# Patient Record
Sex: FEMALE | Race: WHITE | Employment: OTHER | URBAN - NONMETROPOLITAN AREA
[De-identification: names, ages, dates, MRNs, and addresses within clinical notes are randomized per-mention and may not be internally consistent; named-entity substitution may affect disease eponyms.]

---

## 2017-01-23 ENCOUNTER — HOSPITAL ENCOUNTER (OUTPATIENT)
Dept: ULTRASOUND IMAGING | Age: 22
Discharge: HOME | End: 2017-01-23
Payer: COMMERCIAL

## 2017-01-23 DIAGNOSIS — E04.9 ENLARGEMENT OF THYROID: ICD-10-CM

## 2017-01-23 PROCEDURE — 76536 US EXAM OF HEAD AND NECK: CPT

## 2017-02-01 ENCOUNTER — HOSPITAL ENCOUNTER (OUTPATIENT)
Dept: ULTRASOUND IMAGING | Age: 22
Discharge: HOME | End: 2017-02-01
Payer: COMMERCIAL

## 2017-02-01 DIAGNOSIS — E04.1 THYROID NODULE: ICD-10-CM

## 2017-02-01 PROCEDURE — 88177 CYTP FNA EVAL EA ADDL: CPT

## 2017-02-01 PROCEDURE — 60100 BIOPSY OF THYROID: CPT

## 2017-02-01 PROCEDURE — 88305 TISSUE EXAM BY PATHOLOGIST: CPT

## 2017-02-01 PROCEDURE — 88172 CYTP DX EVAL FNA 1ST EA SITE: CPT

## 2017-02-01 PROCEDURE — 88173 CYTOPATH EVAL FNA REPORT: CPT

## 2017-02-02 ENCOUNTER — OFFICE VISIT (OUTPATIENT)
Dept: OBGYN | Age: 22
End: 2017-02-02
Payer: COMMERCIAL

## 2017-02-02 VITALS — WEIGHT: 148.8 LBS | BODY MASS INDEX: 30 KG/M2 | HEIGHT: 59 IN

## 2017-02-02 DIAGNOSIS — N92.6 IRREGULAR PERIODS: ICD-10-CM

## 2017-02-02 DIAGNOSIS — Z76.89 ENCOUNTER TO ESTABLISH CARE: Primary | ICD-10-CM

## 2017-02-02 PROCEDURE — 99203 OFFICE O/P NEW LOW 30 MIN: CPT | Performed by: NURSE PRACTITIONER

## 2017-02-02 RX ORDER — DROSPIRENONE AND ETHINYL ESTRADIOL 0.02-3(28)
1 KIT ORAL DAILY
Qty: 30 TABLET | Refills: 11 | Status: SHIPPED | OUTPATIENT
Start: 2017-02-02 | End: 2018-01-23 | Stop reason: SDUPTHER

## 2017-02-02 ASSESSMENT — ENCOUNTER SYMPTOMS
WHEEZING: 0
APNEA: 0
TROUBLE SWALLOWING: 0
ABDOMINAL PAIN: 0
SHORTNESS OF BREATH: 0
CONSTIPATION: 0
DIARRHEA: 0
NAUSEA: 0
SORE THROAT: 0

## 2017-02-19 PROBLEM — E03.9 ACQUIRED HYPOTHYROIDISM: Status: ACTIVE | Noted: 2017-02-19

## 2017-02-19 PROBLEM — E04.1 THYROID NODULE: Status: ACTIVE | Noted: 2017-02-19

## 2017-02-20 ENCOUNTER — OFFICE VISIT (OUTPATIENT)
Dept: OTOLARYNGOLOGY | Facility: CLINIC | Age: 22
End: 2017-02-20

## 2017-02-20 VITALS
HEART RATE: 51 BPM | BODY MASS INDEX: 30.64 KG/M2 | HEIGHT: 59 IN | TEMPERATURE: 98.9 F | DIASTOLIC BLOOD PRESSURE: 80 MMHG | WEIGHT: 152 LBS | SYSTOLIC BLOOD PRESSURE: 115 MMHG

## 2017-02-20 DIAGNOSIS — E03.9 ACQUIRED HYPOTHYROIDISM: ICD-10-CM

## 2017-02-20 DIAGNOSIS — E04.1 THYROID NODULE: Primary | ICD-10-CM

## 2017-02-20 PROCEDURE — 99203 OFFICE O/P NEW LOW 30 MIN: CPT | Performed by: OTOLARYNGOLOGY

## 2017-02-20 RX ORDER — LEVOTHYROXINE SODIUM 100 MCG
100 TABLET ORAL DAILY
Qty: 30 TABLET | Refills: 3 | Status: SHIPPED | OUTPATIENT
Start: 2017-02-20 | End: 2017-03-22

## 2017-02-20 RX ORDER — DROSPIRENONE AND ETHINYL ESTRADIOL TABLETS 0.02-3(28)
KIT ORAL
Refills: 11 | COMMUNITY
Start: 2017-01-29

## 2017-02-20 RX ORDER — GUAIFENESIN AND PSEUDOEPHEDRINE HYDROCHLORIDE 600; 60 MG/1; MG/1
TABLET, EXTENDED RELEASE ORAL
Refills: 1 | COMMUNITY
Start: 2016-12-29

## 2017-02-20 RX ORDER — CETIRIZINE HYDROCHLORIDE 10 MG/1
TABLET ORAL
COMMUNITY

## 2017-02-20 RX ORDER — MONTELUKAST SODIUM 10 MG/1
TABLET ORAL
Refills: 8 | COMMUNITY
Start: 2017-01-16

## 2017-02-20 RX ORDER — FLUTICASONE PROPIONATE 50 MCG
2 SPRAY, SUSPENSION (ML) NASAL DAILY
COMMUNITY

## 2017-02-20 NOTE — PROGRESS NOTES
Patient Care Team:  Chandni Jerez MD as PCP - General (Pediatrics)  GIOVANA Xiong as PCP - Family Medicine  Dheeraj Houston MD as Consulting Physician (Allergy)  Mike Swain MD as Consulting Physician (Otolaryngology)    No chief complaint on file.      Subjective   History of Present Illness:  Kisha Jones is a  21 y.o.  female who presents for evaluation of thyroid problems. She has had findings of a thyroid nodule. This was noticed by the patients primary care physician in January. She has had no complaints related to the findings. She denies throat pain, globus sensation, voice change or dysphagia.  He has not had lymphadenopathy.  She does not have a history of radiation exposure or family history of thyroid carcinoma.  She has a voice therapy major in college.    Review of Systems:  Review of Systems   Constitutional: Negative.  Negative for chills and fever.   HENT:        As per HPI   Eyes: Negative.    Respiratory: Negative.    Cardiovascular: Negative.    Gastrointestinal: Negative.    Skin: Negative.    Allergic/Immunologic: Positive for environmental allergies.   Neurological: Negative.    Hematological: Negative.  Negative for adenopathy.   Psychiatric/Behavioral: Negative.        Past History:  No past medical history on file.  No past surgical history on file.  No family history on file.  Social History   Substance Use Topics   • Smoking status: Not on file   • Smokeless tobacco: Not on file   • Alcohol use Not on file     No current outpatient prescriptions on file.  Allergies:  Review of patient's allergies indicates not on file.    Objective     Vital Signs:  BP: ()/()   Arterial Line BP: ()/()     Physical Exam:  CONSTITUTIONAL: well nourished, well-developed, alert, oriented, in no acute distress   COMMUNICATION AND VOICE: able to communicate normally, normal voice quality  HEAD: normocephalic, no lesions, atraumatic, no tenderness, no masses   FACE: appearance  normal, no lesions, no tenderness, no deformities, facial motion symmetric  SALIVARY GLANDS: parotid glands with no tenderness, no swelling, no masses, submandibular glands with normal size, nontender  EYES: ocular motility normal, eyelids normal, orbits normal, no proptosis, conjunctiva normal , pupils equal, round   EARS:  Hearing: response to conversational voice normal bilaterally   External Ears: auricles without lesions  Otoscopic: tympanic membrane appearance normal, no lesions, no perforation, normal mobility, no fluid  NOSE:  External Nose: structure normal, no tenderness on palpation, no nasal discharge, no lesions, no evidence of trauma, nostrils patent   Intranasal Exam: nasal mucosa normal, vestibule within normal limits, inferior turbinate normal, nasal septum midline   Nasopharynx: nasopharyngeal mucosa, adenoids within normal limits  ORAL:  Lips: upper and lower lips without lesion   Teeth: dentition within normal limits for age   Gums: gingivae healthy   Oral Mucosa: oral mucosa normal, no mucosal lesions   Floor of Mouth: Warthin’s duct patent, mucosa normal  Tongue: lingual mucosa normal without lesions, normal tongue mobility   Palate: soft and hard palates with normal mucosa and structure  Oropharynx: oropharyngeal mucosa normal, tonsils normal in appearance   HYPOPHARYNX: hypopharyngeal mucosa normal  LARYNX: epiglottis and arytenoid cartilage within normal limits, vocal cord mucosa normal with normal mobility   NECK: neck appearance normal, no masses or tenderness  THYROID: no overt thyromegaly, no tenderness, nodules or mass present on palpation (nodule not palpable), position midline   LYMPH NODES: no lymphadenopathy  CHEST/RESPIRATORY: respiratory effort normal, normal breath sounds   CARDIOVASCULAR: rate and rhythm normal, extremities without cyanosis or edema    NEUROLOGIC/PSYCHIATRIC: oriented to time, place and person, mood normal, affect appropriate, CN II-XII intact grossly    Results  "Review:   FNA 2/1/2017- atypia of uncertain significance, Jia testing- suspicious for malignancy  TSH- 6.5 (H)   Thyroid Ultrasound: 1/23/17- 1.2 cm heterogeneous nodule on left with several echogenic foci     Assessment   1. Thyroid nodule    2. Acquired hypothyroidism        Plan   Medical and surgical options were discussed including medical and surgical options. Risks, benefits and alternatives were discussed and questions were answered. We discussed that based on the current American Thyroid Association recommendations, this nodule would be considered a low risk nodule with the need for biopsy when > 1.5 cm (this was 1.2 cm).  The results of the fine needle aspiration were discussed in great detail with the patient. The diagnosis of atypia of uncertain significance carries a 5-15% malignancy rate.The patient had the RosettaGX Reveal mRNA testing performed after this result. This testing revealed \"suspicious for malignancy\" results. Benign results carry a very high negative predictive value (91-99%) and give more assurance that the nodule can be observed safely with very low risk of malignancy. Suspicious for malignancy results do not carry as of a predictive value (62% positive predictive value) meaning 38% of \"suspicious for malignancy\" results can actually be benign. Recommendations for \"suspicious for malignancy\" results are usually to remove the nodule (thyroidectomy) for diagnosis and treatment. After considering the options, the patient decided to proceed with surgery.    New Medications Ordered This Visit   Medications   • SYNTHROID 100 MCG tablet     Sig: Take 1 tablet by mouth Daily for 30 days.     Dispense:  30 tablet     Refill:  3        -----SURGERY SCHEDULING:-----  Schedule left thyroidectomy     ---INFORMED CONSENT DISCUSSION:---  THYROIDECTOMY: A thyroidectomy was recommended. The risks and benefits were explained including but not limited to bleeding, infection, persistent and/or " recurrent disease, risks of the general anesthesia, pain, recurrent laryngeal nerve injury with hoarseness and airway loss, parathyroid injury and hypocalcemia. Operative possibilities including hemithyroidectomy, total thyroidectomy and nimisha dissections were discussed. Possibilities for delayed need for total thyroidectomy pending pathologic diagnosis were also discussed. Alternatives were discussed. No guarantees were made or implied. Questions were asked appropriately answered.       ---PREOPERATIVE WORKUP:---  CBC  CMP  Chest X-Ray  EKG     Follow up  postoperatively    Mike Swain MD  02/19/17  6:37 PM

## 2017-02-20 NOTE — PATIENT INSTRUCTIONS
"The diagnosis of atypia of uncertain significance carries a 5-15% malignancy rate.You had the RosettaGX Reveal mRNA testing performed after this result. This testing revealed \"suspicious for malignancy\" results. Benign results carry a very high negative predictive value (91-99%) and give more assurance that the nodule can be observed safely with very low risk of malignancy. Suspicious for malignancy results do not carry as of a predictive value (62% positive predictive value) meaning 38% of \"suspicious for malignancy\" results can actually be benign. Recommendations for \"suspicious for malignancy\" results are usually to remove the nodule (thyroidectomy) for diagnosis and treatment.   "

## 2017-08-04 ENCOUNTER — TELEPHONE (OUTPATIENT)
Dept: FAMILY MEDICINE CLINIC | Age: 22
End: 2017-08-04

## 2017-08-17 ENCOUNTER — TELEPHONE (OUTPATIENT)
Dept: OTOLARYNGOLOGY | Facility: CLINIC | Age: 22
End: 2017-08-17

## 2017-08-17 NOTE — TELEPHONE ENCOUNTER
Called and spoke to patient about her previous appointment and asked if she had changed her mind as far as surgery, or follow up with treatment.  She stated she has had the surgery at another facility.

## 2017-08-17 NOTE — TELEPHONE ENCOUNTER
----- Message from Mike Swain MD sent at 8/16/2017  6:39 PM CDT -----  Regarding: follow up  This patient was seen earlier in the year with a needle biopsy that had atypia of uncertain significance.  Her Jia molecular testing showed suspicious for malignancy findings.  He had a long discussion about these findings and the possibility that there still could be a good chance that this is still benign.  However, due to the findings, at least a thyroid lobectomy would typically be recommended and that is what I did recommend.  However, they canceled this and I do not think we have a routine follow-up for them.  Can you call them at some point in time to at least set up a follow-up appointment with a repeat ultrasound to check the size?    BAO Swain MD

## 2017-09-25 RX ORDER — ALBUTEROL SULFATE 90 UG/1
2 AEROSOL, METERED RESPIRATORY (INHALATION) EVERY 4 HOURS PRN
Qty: 1 INHALER | Refills: 2 | Status: SHIPPED | OUTPATIENT
Start: 2017-09-25

## 2017-09-25 RX ORDER — CETIRIZINE HYDROCHLORIDE 10 MG/1
10 TABLET ORAL DAILY
Qty: 30 TABLET | Refills: 2 | Status: SHIPPED | OUTPATIENT
Start: 2017-09-25

## 2017-09-25 RX ORDER — MONTELUKAST SODIUM 10 MG/1
10 TABLET ORAL NIGHTLY
Qty: 30 TABLET | Refills: 2 | Status: SHIPPED | OUTPATIENT
Start: 2017-09-25 | End: 2017-09-25 | Stop reason: SDUPTHER

## 2017-09-25 RX ORDER — MONTELUKAST SODIUM 10 MG/1
10 TABLET ORAL NIGHTLY
Qty: 30 TABLET | Refills: 2 | Status: SHIPPED | OUTPATIENT
Start: 2017-09-25

## 2017-09-25 RX ORDER — EPINEPHRINE 0.3 MG/.3ML
0.3 INJECTION SUBCUTANEOUS ONCE
Qty: 1 EACH | Refills: 0 | Status: SHIPPED | OUTPATIENT
Start: 2017-09-25 | End: 2017-09-25

## 2018-01-23 DIAGNOSIS — N92.6 IRREGULAR PERIODS: ICD-10-CM

## 2018-01-23 RX ORDER — DROSPIRENONE AND ETHINYL ESTRADIOL 0.02-3(28)
1 KIT ORAL DAILY
Qty: 30 TABLET | Refills: 0 | Status: SHIPPED | OUTPATIENT
Start: 2018-01-23 | End: 2018-02-27 | Stop reason: SDUPTHER

## 2018-02-28 DIAGNOSIS — N92.6 IRREGULAR PERIODS: ICD-10-CM

## 2018-03-01 RX ORDER — DROSPIRENONE AND ETHINYL ESTRADIOL TABLETS 0.02-3(28)
1 KIT ORAL DAILY
Qty: 28 TABLET | Refills: 5 | Status: SHIPPED | OUTPATIENT
Start: 2018-03-01